# Patient Record
Sex: MALE | Race: WHITE | NOT HISPANIC OR LATINO | Employment: FULL TIME | URBAN - METROPOLITAN AREA
[De-identification: names, ages, dates, MRNs, and addresses within clinical notes are randomized per-mention and may not be internally consistent; named-entity substitution may affect disease eponyms.]

---

## 2017-06-14 PROBLEM — F41.8 MIXED ANXIETY DEPRESSIVE DISORDER: Status: ACTIVE | Noted: 2017-05-17

## 2019-06-06 ENCOUNTER — PATIENT OUTREACH (OUTPATIENT)
Dept: ADMINISTRATIVE | Facility: HOSPITAL | Age: 34
End: 2019-06-06

## 2024-01-27 ENCOUNTER — OUTSIDE PLACE OF SERVICE (OUTPATIENT)
Dept: ADMINISTRATIVE | Facility: OTHER | Age: 39
End: 2024-01-27
Payer: MEDICAID

## 2024-02-12 ENCOUNTER — TELEPHONE (OUTPATIENT)
Dept: NEUROSURGERY | Facility: CLINIC | Age: 39
End: 2024-02-12
Payer: MEDICAID

## 2024-02-12 DIAGNOSIS — Z98.1 S/P CERVICAL SPINAL FUSION: Primary | ICD-10-CM

## 2024-02-12 RX ORDER — TRAMADOL HYDROCHLORIDE 50 MG/1
50 TABLET ORAL EVERY 6 HOURS PRN
Qty: 28 TABLET | Refills: 3 | Status: SHIPPED | OUTPATIENT
Start: 2024-02-12 | End: 2024-02-12 | Stop reason: SDUPTHER

## 2024-02-12 NOTE — TELEPHONE ENCOUNTER
Returned call to pt, pt stated that he had a cervical fusion done with  on 1/29 and he will need his post-op appointments. I scheduled pt to see Negra on Wednesday 2/14 at 1 and xrays at 12. Pt si also requesting to know if  can call him in some pain medication. I stated to pt that I will send a message over to . Pt voiced understanding

## 2024-02-14 ENCOUNTER — HOSPITAL ENCOUNTER (OUTPATIENT)
Dept: RADIOLOGY | Facility: HOSPITAL | Age: 39
Discharge: HOME | End: 2024-02-14
Attending: PHYSICIAN ASSISTANT
Payer: MEDICAID

## 2024-02-14 ENCOUNTER — OFFICE VISIT (OUTPATIENT)
Dept: NEUROSURGERY | Facility: CLINIC | Age: 39
End: 2024-02-14
Payer: MEDICAID

## 2024-02-14 ENCOUNTER — TELEPHONE (OUTPATIENT)
Dept: NEUROSURGERY | Facility: CLINIC | Age: 39
End: 2024-02-14
Payer: MEDICAID

## 2024-02-14 ENCOUNTER — TELEPHONE (OUTPATIENT)
Dept: NEUROSURGERY | Facility: CLINIC | Age: 39
End: 2024-02-14

## 2024-02-14 VITALS
HEART RATE: 112 BPM | WEIGHT: 211 LBS | DIASTOLIC BLOOD PRESSURE: 87 MMHG | HEIGHT: 70 IN | TEMPERATURE: 99 F | SYSTOLIC BLOOD PRESSURE: 139 MMHG | BODY MASS INDEX: 30.21 KG/M2

## 2024-02-14 DIAGNOSIS — F17.200 SMOKING: ICD-10-CM

## 2024-02-14 DIAGNOSIS — Z98.1 S/P CERVICAL SPINAL FUSION: Primary | ICD-10-CM

## 2024-02-14 DIAGNOSIS — Z98.1 S/P CERVICAL SPINAL FUSION: ICD-10-CM

## 2024-02-14 DIAGNOSIS — S02.609D CLOSED FRACTURE OF JAW WITH ROUTINE HEALING, SUBSEQUENT ENCOUNTER: ICD-10-CM

## 2024-02-14 PROCEDURE — 72040 X-RAY EXAM NECK SPINE 2-3 VW: CPT | Mod: TC,FY

## 2024-02-14 PROCEDURE — 99999 PR PBB SHADOW E&M-EST. PATIENT-LVL V: CPT | Mod: PBBFAC,,, | Performed by: PHYSICIAN ASSISTANT

## 2024-02-14 PROCEDURE — 99024 POSTOP FOLLOW-UP VISIT: CPT | Mod: ,,, | Performed by: PHYSICIAN ASSISTANT

## 2024-02-14 PROCEDURE — 3075F SYST BP GE 130 - 139MM HG: CPT | Mod: CPTII,,, | Performed by: PHYSICIAN ASSISTANT

## 2024-02-14 PROCEDURE — 72040 X-RAY EXAM NECK SPINE 2-3 VW: CPT | Mod: 26,,, | Performed by: RADIOLOGY

## 2024-02-14 PROCEDURE — 1159F MED LIST DOCD IN RCRD: CPT | Mod: CPTII,,, | Performed by: PHYSICIAN ASSISTANT

## 2024-02-14 PROCEDURE — 1160F RVW MEDS BY RX/DR IN RCRD: CPT | Mod: CPTII,,, | Performed by: PHYSICIAN ASSISTANT

## 2024-02-14 PROCEDURE — 3079F DIAST BP 80-89 MM HG: CPT | Mod: CPTII,,, | Performed by: PHYSICIAN ASSISTANT

## 2024-02-14 PROCEDURE — 99215 OFFICE O/P EST HI 40 MIN: CPT | Mod: PBBFAC,25,PN | Performed by: PHYSICIAN ASSISTANT

## 2024-02-14 RX ORDER — TIZANIDINE 4 MG/1
4 TABLET ORAL EVERY 8 HOURS PRN
Qty: 40 TABLET | Refills: 0 | Status: SHIPPED | OUTPATIENT
Start: 2024-02-14

## 2024-02-14 RX ORDER — GABAPENTIN 300 MG/1
CAPSULE ORAL
Qty: 270 CAPSULE | Refills: 0 | Status: SHIPPED | OUTPATIENT
Start: 2024-02-14 | End: 2024-03-20 | Stop reason: SDUPTHER

## 2024-02-14 RX ORDER — TRAMADOL HYDROCHLORIDE 50 MG/1
50 TABLET ORAL EVERY 6 HOURS PRN
Qty: 28 TABLET | Refills: 3 | Status: SHIPPED | OUTPATIENT
Start: 2024-02-14 | End: 2024-02-14 | Stop reason: ALTCHOICE

## 2024-02-14 RX ORDER — OXYCODONE HYDROCHLORIDE 10 MG/1
10 TABLET ORAL EVERY 6 HOURS PRN
COMMUNITY
Start: 2024-01-29 | End: 2024-02-14 | Stop reason: ALTCHOICE

## 2024-02-14 RX ORDER — OXYCODONE HYDROCHLORIDE 10 MG/1
10 TABLET ORAL EVERY 6 HOURS PRN
Qty: 28 TABLET | Refills: 0 | Status: SHIPPED | OUTPATIENT
Start: 2024-02-14 | End: 2024-02-14 | Stop reason: SDUPTHER

## 2024-02-14 RX ORDER — IBUPROFEN 200 MG
1 TABLET ORAL DAILY
Qty: 30 PATCH | Refills: 1 | Status: SHIPPED | OUTPATIENT
Start: 2024-02-14

## 2024-02-14 NOTE — TELEPHONE ENCOUNTER
Dr. Valencia,    This patient is 2 weeks po ACDF from [facility].  He has left hand weakness, left wrist drop and can't move his fingers.    He is doing outpatient OT for his left hand.    Is there anything other tests or imaging we need to get in the next couple of months?    Thanks,  Negra Sahni, [facility], PA-C  Neurosurgery  [facility]  02/14/2024

## 2024-02-14 NOTE — PROGRESS NOTES
"Subjective:     Patient ID:  Kaleb Wolfe is a 39 y.o. male.    Erik    Chief Complaint: 2 week po fu    C5-6 C6-7 ACDF- [facility]    HPI    Kaleb Wolfe is a 39 y.o. male who presents for follow up.  Patient has neck pain not relieved with recent tramadol prescription. New right arm paresthesias to the hand that come and go.  Still with paresthesias in the left hand with radiation up the left arm to the upper arm.  Still with limited movement of the left wrist and fingers.  He has just started OT for his left hand.  Has been wearing the neck brace.  Smoking some.    Patient denies any recent accidents or trauma, no saddle anesthesias, and no bowel or bladder incontinence.      Review of Systems:  Constitution: Negative for chills, fever, night sweats and weight loss.   Musculoskeletal: Negative for falls.   Gastrointestinal: Negative for bowel incontinence, nausea and vomiting.   Genitourinary: Negative for bladder incontinence.   Neurological: Negative for disturbances in coordination and loss of balance.      Objective:      Vitals:    02/14/24 1321   BP: 139/87   Pulse: (!) 112   Temp: 98.5 °F (36.9 °C)   Weight: 95.7 kg (210 lb 15.7 oz)   Height: 5' 10" (1.778 m)   PainSc:   8   PainLoc: Neck         Physical Exam:      General:  Kaleb Wolfe is well-developed, well-nourished, appears stated age, in no acute distress, alert and oriented to person, place, and time.    Pulmonary/Chest:  Respiratory effort normal  Abdominal: Exhibits no distension  Psychiatric:  Normal mood and affect.  Behavior is normal.  Judgement and thought content normal      Musculoskeletal:    Cervical Spine Inspection:  surgical scars with no visible rashes.  Wound edges intact.  n.      Neurological:    Muscle strength against resistance:     Right Left   Deltoid  5 / 5 5 / 5   Biceps  5 / 5 5 / 5   Triceps 5 / 5 5 / 5   Wrist flexion  5 / 5 3 / 5   Wrist extension 5 / 5 0 / 5   Finger abduction 5 / 5 0 / 5   Thumb " opposition 5 / 5 0 / 5   Handgrip 5 / 5 0 / 5   Hip flexion  5 / 5 5 / 5   Hip extension 5 / 5 5 / 5   Hip abduction 5 / 5 5 / 5   Hip adduction 5/ 5 5 / 5   Knee extension  5 / 5 5 / 5   Knee flexion  5 / 5 5 / 5   Dorsiflexion  5 / 5 5 / 5   EHL  5 / 5 5 / 5   Plantar flexion  5 / 5 5 / 5   Inversion of the feet 5 / 5 5 / 5   Eversion of the feet 5 / 5 5 / 5       Reflexes:     Right Left   Triceps 2+ 2+   Biceps 2+ 2+   Brachioradialis 2+ 2+   Patellar 3+ 3+   Achilles 3+ 3+       Clonus:  Positive bilaterally  Gore: Negative bilaterally    On gross examination of the bilateral lower extremities, patient has full painfree ROM with no signs of clubbing, cyanosis, edema, and weakness.      XRAY Interpretation:     Cervical spine xrays were personally reviewed today.  No fractures. Hardware intact.      Assessment:          1. S/P cervical spinal fusion    2. Smoking    3. Closed fracture of jaw with routine healing, subsequent encounter            Plan:          Orders Placed This Encounter    X-Ray Cervical Spine AP And Lateral    X-Ray Cervical Spine AP And Lateral    X-Ray Facial Bones  3 Or More View    Ambulatory referral/consult to Smoking Cessation Program    gabapentin (NEURONTIN) 300 MG capsule    tiZANidine (ZANAFLEX) 4 MG tablet    oxyCODONE (ROXICODONE) 10 mg Tab immediate release tablet    nicotine (NICODERM CQ) 21 mg/24 hr       -Smoking cessation referral  -Nicotine patch  -Bone growth stimulator  -Avoid NSAIDs  -Oxycodone PRN.  DC tramadol  -Tizanidine spasms  -Start Gabapentin  -Xrays facial bones and fu with PCP for further recs  -FU in 4 weeks and at 3 months po with xrays for post op visits  -Continue to wear neck brace at all time except when showering and eating    Will review the above with Dr. Valencia    Follow-Up:  Follow up in about 4 weeks (around 3/13/2024). If there are any questions prior to this, the patient was instructed to contact the office.       Negra Sahni, [facility],  TL  Neurosurgery  [facility]  02/14/2024

## 2024-02-15 ENCOUNTER — TELEPHONE (OUTPATIENT)
Dept: NEUROSURGERY | Facility: CLINIC | Age: 39
End: 2024-02-15
Payer: MEDICAID

## 2024-02-15 ENCOUNTER — PATIENT MESSAGE (OUTPATIENT)
Dept: NEUROSURGERY | Facility: CLINIC | Age: 39
End: 2024-02-15
Payer: MEDICAID

## 2024-02-15 ENCOUNTER — HOSPITAL ENCOUNTER (OUTPATIENT)
Dept: RADIOLOGY | Facility: HOSPITAL | Age: 39
Discharge: HOME | End: 2024-02-15
Attending: PHYSICIAN ASSISTANT
Payer: MEDICAID

## 2024-02-15 DIAGNOSIS — S02.609D CLOSED FRACTURE OF JAW WITH ROUTINE HEALING, SUBSEQUENT ENCOUNTER: ICD-10-CM

## 2024-02-15 DIAGNOSIS — M54.12 CERVICAL RADICULOPATHY AT C8: Primary | ICD-10-CM

## 2024-02-15 PROCEDURE — 70150 X-RAY EXAM OF FACIAL BONES: CPT | Mod: 26,,, | Performed by: RADIOLOGY

## 2024-02-15 PROCEDURE — 70150 X-RAY EXAM OF FACIAL BONES: CPT | Mod: TC,PO

## 2024-02-15 RX ORDER — OXYCODONE HYDROCHLORIDE 10 MG/1
10 TABLET ORAL EVERY 6 HOURS PRN
Qty: 28 TABLET | Refills: 0 | Status: SHIPPED | OUTPATIENT
Start: 2024-02-15 | End: 2024-02-20 | Stop reason: SDUPTHER

## 2024-02-15 NOTE — TELEPHONE ENCOUNTER
----- Message from Patricia Cordero MA sent at 2/14/2024  3:42 PM CST -----  Good Afternoon Mrs. Omer,     [facility] he was wondering if medication can be sent this pharmacy instead :  [id number] - [place], [place] - [address]  AT [facility] [address] [facility] [address]   ----- Message -----  From: Gorge Garcia  Sent: 2/14/2024   3:40 PM CST  To: Bora LOZANO Staff    Type:  Pt Calling to Clarify an RX    Name of Caller:pt  Pharmacy Name:[facility] #[id number] - [place], [place] - [address] AT [facility] [address] [place]   Phone: [phone]  Fax: [phone]  Prescription Name:oxyCODONE (ROXICODONE) 10 mg Tab immediate release tablet  What do they need to clarify[facility]   Best Call Back Number:[phone]  Additional Information: please send to pharmacy listed above

## 2024-02-15 NOTE — TELEPHONE ENCOUNTER
Oxycodone sent to [facility] in [facility]    Negra Sahni, MARGUERITE, PA-C  Neurosurgery  [facility]  02/15/2024

## 2024-02-16 ENCOUNTER — TELEPHONE (OUTPATIENT)
Dept: NEUROSURGERY | Facility: CLINIC | Age: 39
End: 2024-02-16
Payer: MEDICAID

## 2024-02-16 NOTE — TELEPHONE ENCOUNTER
Spoke with patient in regards to his medication and miscommunication on supply quantity. Pt also states he has a orbital fx.    AS

## 2024-02-16 NOTE — TELEPHONE ENCOUNTER
Please clarify.  I didn't order PT.  He should only get PT/OT for his left hand and arm.    Thanks,  Negra Sahni, [facility], PA-C  Neurosurgery  [facility]  02/16/2024

## 2024-02-16 NOTE — TELEPHONE ENCOUNTER
----- Message from Patricia Cordero MA sent at 2/16/2024 10:27 AM CST -----  Regarding: FW: Call back  Contact: [phone]  Anneliese Mrs. Omer,     Pt is requesting PT orders to be sent to [facility] but I do not see any PT orders in his active requests or referrals. Please Advise.     Thanks,   Jay Jay   ----- Message -----  From: Emilie Vegas  Sent: 2/16/2024  10:08 AM CST  To: Bora LOZANO Staff  Subject: Call back                                        Who Called: PT     Patient is calling to get orders for physical therapy to be sent to [facility] there number is [phone] fax [phone]. Please advice

## 2024-02-19 ENCOUNTER — PATIENT MESSAGE (OUTPATIENT)
Dept: NEUROSURGERY | Facility: CLINIC | Age: 39
End: 2024-02-19
Payer: MEDICAID

## 2024-02-20 ENCOUNTER — TELEPHONE (OUTPATIENT)
Dept: ORTHOPEDICS | Facility: CLINIC | Age: 39
End: 2024-02-20
Payer: MEDICAID

## 2024-02-20 RX ORDER — OXYCODONE HYDROCHLORIDE 10 MG/1
10 TABLET ORAL EVERY 6 HOURS PRN
Qty: 28 TABLET | Refills: 0 | Status: SHIPPED | OUTPATIENT
Start: 2024-02-22 | End: 2024-02-26 | Stop reason: SDUPTHER

## 2024-02-20 NOTE — TELEPHONE ENCOUNTER
Pt wants phone call to review Xrays and talk about a potential physician for his face fractures.     AS

## 2024-02-20 NOTE — TELEPHONE ENCOUNTER
Dr. Valencia said that     Dr Rossi ENT at [facility] who should follow-up on this patient.    Ghulam Sullivan MD Ophthalmology Consulting Physician Ophthalmology at [facility] also.    These MDs saw him at his last hospital admission. Please contact [facility] outpatient clinics to try to get him setup.    Thanks,  Negra Sahni, [facility], PA-C  Neurosurgery  [facility]  02/20/2024

## 2024-02-20 NOTE — TELEPHONE ENCOUNTER
AMOS for patient reiterating I made a mistake on whether he needed 30 day or 7 day supply. Explained he needs to go to Dr. Valencia for his next refill request

## 2024-02-21 ENCOUNTER — HOSPITAL ENCOUNTER (OUTPATIENT)
Dept: RADIOLOGY | Facility: HOSPITAL | Age: 39
Discharge: HOME | End: 2024-02-21
Attending: NEUROLOGICAL SURGERY
Payer: MEDICAID

## 2024-02-21 DIAGNOSIS — Z98.1 S/P CERVICAL SPINAL FUSION: ICD-10-CM

## 2024-02-21 PROCEDURE — 72040 X-RAY EXAM NECK SPINE 2-3 VW: CPT | Mod: TC,PO

## 2024-02-21 PROCEDURE — 72040 X-RAY EXAM NECK SPINE 2-3 VW: CPT | Mod: 26,,, | Performed by: RADIOLOGY

## 2024-02-22 ENCOUNTER — PATIENT MESSAGE (OUTPATIENT)
Dept: NEUROSURGERY | Facility: CLINIC | Age: 39
End: 2024-02-22
Payer: MEDICAID

## 2024-02-26 ENCOUNTER — PATIENT MESSAGE (OUTPATIENT)
Dept: NEUROSURGERY | Facility: CLINIC | Age: 39
End: 2024-02-26
Payer: MEDICAID

## 2024-02-26 NOTE — TELEPHONE ENCOUNTER
Pt is requesting a refill request from Mrs. Negra DOTY and after this refill will switch to PCP or Dr. Valencia

## 2024-02-27 ENCOUNTER — PATIENT MESSAGE (OUTPATIENT)
Dept: NEUROSURGERY | Facility: CLINIC | Age: 39
End: 2024-02-27
Payer: MEDICAID

## 2024-02-27 RX ORDER — OXYCODONE HYDROCHLORIDE 10 MG/1
10 TABLET ORAL EVERY 6 HOURS PRN
Qty: 28 TABLET | Refills: 0 | Status: SHIPPED | OUTPATIENT
Start: 2024-02-29 | End: 2024-03-20 | Stop reason: SDUPTHER

## 2024-02-29 ENCOUNTER — TELEPHONE (OUTPATIENT)
Dept: NEUROSURGERY | Facility: CLINIC | Age: 39
End: 2024-02-29
Payer: MEDICAID

## 2024-02-29 ENCOUNTER — PATIENT MESSAGE (OUTPATIENT)
Dept: NEUROSURGERY | Facility: CLINIC | Age: 39
End: 2024-02-29
Payer: MEDICAID

## 2024-02-29 DIAGNOSIS — Z98.1 S/P CERVICAL SPINAL FUSION: Primary | ICD-10-CM

## 2024-03-13 ENCOUNTER — TELEPHONE (OUTPATIENT)
Dept: NEUROSURGERY | Facility: CLINIC | Age: 39
End: 2024-03-13
Payer: MEDICAID

## 2024-03-19 ENCOUNTER — TELEPHONE (OUTPATIENT)
Dept: NEUROSURGERY | Facility: CLINIC | Age: 39
End: 2024-03-19
Payer: MEDICAID

## 2024-03-19 NOTE — TELEPHONE ENCOUNTER
He has no transportation so we rescheduled his Xrays closer to his home and made the appointment virtual. Pt has had to cancel multiple times and wants a virtual appt at least.    AS

## 2024-03-20 ENCOUNTER — TELEPHONE (OUTPATIENT)
Dept: NEUROLOGY | Facility: CLINIC | Age: 39
End: 2024-03-20
Payer: MEDICAID

## 2024-03-20 ENCOUNTER — OFFICE VISIT (OUTPATIENT)
Dept: NEUROSURGERY | Facility: CLINIC | Age: 39
End: 2024-03-20
Payer: MEDICAID

## 2024-03-20 ENCOUNTER — TELEPHONE (OUTPATIENT)
Dept: NEUROSURGERY | Facility: CLINIC | Age: 39
End: 2024-03-20
Payer: MEDICAID

## 2024-03-20 ENCOUNTER — HOSPITAL ENCOUNTER (OUTPATIENT)
Dept: RADIOLOGY | Facility: HOSPITAL | Age: 39
Discharge: HOME | End: 2024-03-20
Attending: PHYSICIAN ASSISTANT
Payer: MEDICAID

## 2024-03-20 DIAGNOSIS — M54.2 NECK PAIN: ICD-10-CM

## 2024-03-20 DIAGNOSIS — Z98.1 S/P CERVICAL SPINAL FUSION: ICD-10-CM

## 2024-03-20 DIAGNOSIS — Z98.1 S/P CERVICAL SPINAL FUSION: Primary | ICD-10-CM

## 2024-03-20 DIAGNOSIS — R29.898 LEFT HAND WEAKNESS: ICD-10-CM

## 2024-03-20 PROCEDURE — 99024 POSTOP FOLLOW-UP VISIT: CPT | Mod: 95,,, | Performed by: PHYSICIAN ASSISTANT

## 2024-03-20 PROCEDURE — 72040 X-RAY EXAM NECK SPINE 2-3 VW: CPT | Mod: TC,PO

## 2024-03-20 PROCEDURE — 72040 X-RAY EXAM NECK SPINE 2-3 VW: CPT | Mod: 26,,, | Performed by: RADIOLOGY

## 2024-03-20 PROCEDURE — 1159F MED LIST DOCD IN RCRD: CPT | Mod: CPTII,95,, | Performed by: PHYSICIAN ASSISTANT

## 2024-03-20 PROCEDURE — 1160F RVW MEDS BY RX/DR IN RCRD: CPT | Mod: CPTII,95,, | Performed by: PHYSICIAN ASSISTANT

## 2024-03-20 RX ORDER — OXYCODONE HYDROCHLORIDE 10 MG/1
10 TABLET ORAL EVERY 6 HOURS PRN
Qty: 28 TABLET | Refills: 0 | Status: SHIPPED | OUTPATIENT
Start: 2024-03-20 | End: 2024-04-24 | Stop reason: SDUPTHER

## 2024-03-20 RX ORDER — GABAPENTIN 300 MG/1
300 CAPSULE ORAL 3 TIMES DAILY
Qty: 90 CAPSULE | Refills: 2 | Status: SHIPPED | OUTPATIENT
Start: 2024-03-20

## 2024-03-20 RX ORDER — CELECOXIB 200 MG/1
200 CAPSULE ORAL 2 TIMES DAILY PRN
Qty: 60 CAPSULE | Refills: 2 | Status: SHIPPED | OUTPATIENT
Start: 2024-03-20

## 2024-03-20 NOTE — TELEPHONE ENCOUNTER
Please move his EMG to an earlier date.    Negra Sahni, [facility], PA-C  Neurosurgery  [facility]  03/20/2024

## 2024-03-22 ENCOUNTER — TELEPHONE (OUTPATIENT)
Dept: NEUROSURGERY | Facility: CLINIC | Age: 39
End: 2024-03-22
Payer: MEDICAID

## 2024-03-22 NOTE — TELEPHONE ENCOUNTER
Returned call to [facility] physical therapy spoke w/ Jaimie, I stated to Jaimie that Negra wanted him to do PT/OT for arms and hands. I also stated I will follow up with Negra for clarification. Jaimie voiced understanding

## 2024-03-25 ENCOUNTER — TELEPHONE (OUTPATIENT)
Dept: NEUROSURGERY | Facility: CLINIC | Age: 39
End: 2024-03-25
Payer: MEDICAID

## 2024-03-25 DIAGNOSIS — R29.898 LEFT HAND WEAKNESS: ICD-10-CM

## 2024-03-25 DIAGNOSIS — Z98.1 S/P CERVICAL SPINAL FUSION: Primary | ICD-10-CM

## 2024-03-25 NOTE — TELEPHONE ENCOUNTER
----- Message from Roderick Hayden MA sent at 3/22/2024  1:39 PM CDT -----  Did you want the pt to do internal PT/OT for his arms and hands. Please advise    MT   ----- Message -----  From: Talya Villar  Sent: 3/22/2024   1:29 PM CDT  To: Bora LOZANO Staff    Type:  Referral for Physical Therapy     Who Called: [facility]   Would the patient rather a call back or a response via [facility]? Call back   Best Call Back Number: [phone]  Additional Information: Please be advised, caller if physical therapy referral can be sent over to [facility] since pt moved to [place] and have it faxed to [phone]

## 2024-03-25 NOTE — TELEPHONE ENCOUNTER
External PT ordered for sp neck fusion and OT ordered for left hand weakness.    Please print out and fax.    Let patient know    MARGUERITE Espinoza, PA-C  Neurosurgery  [facility]  03/25/2024

## 2024-04-22 ENCOUNTER — TELEPHONE (OUTPATIENT)
Dept: NEUROSURGERY | Facility: CLINIC | Age: 39
End: 2024-04-22
Payer: MEDICAID

## 2024-04-22 ENCOUNTER — PATIENT MESSAGE (OUTPATIENT)
Dept: NEUROSURGERY | Facility: CLINIC | Age: 39
End: 2024-04-22
Payer: MEDICAID

## 2024-04-22 NOTE — TELEPHONE ENCOUNTER
Returned pt's call, pt is requesting to know if I know what he was talking about with his hand. I stated to pt that I sent a message over to Negra and she will discuss it with him. Pt voiced understanding

## 2024-04-23 ENCOUNTER — TELEPHONE (OUTPATIENT)
Dept: NEUROSURGERY | Facility: CLINIC | Age: 39
End: 2024-04-23
Payer: MEDICAID

## 2024-04-23 NOTE — TELEPHONE ENCOUNTER
Dr. Valencia,    He is getting an EMG on April 24th.  He has been working with PTOT.    Do you recommend a splint for his wrist?    Negra Sahni, [facility], PA-C  Neurosurgery  [facility]  04/23/2024

## 2024-04-23 NOTE — TELEPHONE ENCOUNTER
Returned pt's call, pt is requesting to know if we heard anything from Negra about the splint. I stated to pt that I have not heard anything else. Pt voiced understanding

## 2024-04-24 ENCOUNTER — PROCEDURE VISIT (OUTPATIENT)
Dept: NEUROLOGY | Facility: CLINIC | Age: 39
End: 2024-04-24
Payer: MEDICAID

## 2024-04-24 DIAGNOSIS — G56.32 RADIAL NERVE PALSY, LEFT: Primary | ICD-10-CM

## 2024-04-24 DIAGNOSIS — G56.32 LEFT RADIAL NERVE PALSY: ICD-10-CM

## 2024-04-24 PROCEDURE — 95886 MUSC TEST DONE W/N TEST COMP: CPT | Mod: 26,S$PBB,, | Performed by: PSYCHIATRY & NEUROLOGY

## 2024-04-24 PROCEDURE — 99203 OFFICE O/P NEW LOW 30 MIN: CPT | Mod: S$PBB,,, | Performed by: PSYCHIATRY & NEUROLOGY

## 2024-04-24 PROCEDURE — 95913 NRV CNDJ TEST 13/> STUDIES: CPT | Mod: 26,S$PBB,, | Performed by: PSYCHIATRY & NEUROLOGY

## 2024-04-24 PROCEDURE — 95913 NRV CNDJ TEST 13/> STUDIES: CPT | Mod: PBBFAC,PN | Performed by: PSYCHIATRY & NEUROLOGY

## 2024-04-24 PROCEDURE — 95886 MUSC TEST DONE W/N TEST COMP: CPT | Mod: PBBFAC,PN | Performed by: PSYCHIATRY & NEUROLOGY

## 2024-04-24 RX ORDER — OXYCODONE HYDROCHLORIDE 10 MG/1
10 TABLET ORAL EVERY 6 HOURS PRN
Qty: 28 TABLET | Refills: 0 | Status: SHIPPED | OUTPATIENT
Start: 2024-04-24 | End: 2024-05-08 | Stop reason: SDUPTHER
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
## 2024-04-26 ENCOUNTER — TELEPHONE (OUTPATIENT)
Dept: NEUROSURGERY | Facility: CLINIC | Age: 39
End: 2024-04-26
Payer: MEDICAID

## 2024-04-26 ENCOUNTER — PATIENT MESSAGE (OUTPATIENT)
Dept: NEUROSURGERY | Facility: CLINIC | Age: 39
End: 2024-04-26
Payer: MEDICAID

## 2024-04-26 NOTE — TELEPHONE ENCOUNTER
"Informed pt, per      "referral with neurosurgeon Dr Jovan Bassett for his left radial nerve palsy. Please let the patient know that he needs to follow-up with Dr Bassett for this problem. Dr Bassett treat peripheral nerve issues and may be able to offer him further treatments. I will keep following him for his cervical fusion."      Pt voiced understanding   "

## 2024-04-26 NOTE — TELEPHONE ENCOUNTER
Roderick does the occupational therapist need an order for the splint?  If so we would need the name of the splint.    Thanks,  Negra Sahni, [facility], PA-C  Neurosurgery  [facility]  04/26/2024

## 2024-04-29 ENCOUNTER — TELEPHONE (OUTPATIENT)
Dept: NEUROSURGERY | Facility: CLINIC | Age: 39
End: 2024-04-29
Payer: MEDICAID

## 2024-04-29 NOTE — TELEPHONE ENCOUNTER
Returned pt's call, informed pt that I spoke with his physical therapist they gave me all the information I needed. I also stated that I will send it over to Negra so she can get it ordered. Pt voiced understanding

## 2024-04-30 ENCOUNTER — TELEPHONE (OUTPATIENT)
Dept: NEUROSURGERY | Facility: CLINIC | Age: 39
End: 2024-04-30
Payer: MEDICAID

## 2024-04-30 NOTE — TELEPHONE ENCOUNTER
Hand splint RX done.    Please fax.    Negra Sahni [facility], PAClintC  Neurosurgery  [facility]  04/30/2024

## 2024-04-30 NOTE — TELEPHONE ENCOUNTER
----- Message from Roderick Hayden MA sent at 4/29/2024 11:06 AM CDT -----  Regarding: FW: update  Contact: @ [phone]  Spoke with physical therapist, the splint is called dynamic hand splint  ----- Message -----  From: Wendy Vasquez  Sent: 4/29/2024  10:33 AM CDT  To: Bora LOZANO Staff  Subject: update                                           Pt called in regards to getting a update on his splint that he is requesting .....Please call and adv @ [phone]

## 2024-05-01 ENCOUNTER — OFFICE VISIT (OUTPATIENT)
Dept: NEUROSURGERY | Facility: CLINIC | Age: 39
End: 2024-05-01
Payer: MEDICAID

## 2024-05-01 ENCOUNTER — HOSPITAL ENCOUNTER (OUTPATIENT)
Dept: RADIOLOGY | Facility: HOSPITAL | Age: 39
Discharge: HOME | End: 2024-05-01
Attending: PHYSICIAN ASSISTANT
Payer: MEDICAID

## 2024-05-01 ENCOUNTER — TELEPHONE (OUTPATIENT)
Dept: NEUROSURGERY | Facility: CLINIC | Age: 39
End: 2024-05-01
Payer: MEDICAID

## 2024-05-01 VITALS
SYSTOLIC BLOOD PRESSURE: 125 MMHG | HEIGHT: 70 IN | DIASTOLIC BLOOD PRESSURE: 79 MMHG | WEIGHT: 211 LBS | BODY MASS INDEX: 30.21 KG/M2 | HEART RATE: 96 BPM

## 2024-05-01 DIAGNOSIS — S44.22XD INJURY OF RADIAL NERVE AT LEFT UPPER ARM LEVEL, SUBSEQUENT ENCOUNTER: ICD-10-CM

## 2024-05-01 DIAGNOSIS — Z98.1 S/P CERVICAL SPINAL FUSION: ICD-10-CM

## 2024-05-01 DIAGNOSIS — Z98.1 STATUS POST CERVICAL SPINAL FUSION: Primary | ICD-10-CM

## 2024-05-01 DIAGNOSIS — Z98.1 S/P CERVICAL SPINAL FUSION: Primary | ICD-10-CM

## 2024-05-01 PROCEDURE — 3078F DIAST BP <80 MM HG: CPT | Mod: CPTII,,, | Performed by: NEUROLOGICAL SURGERY

## 2024-05-01 PROCEDURE — 72040 X-RAY EXAM NECK SPINE 2-3 VW: CPT | Mod: 26,,, | Performed by: RADIOLOGY

## 2024-05-01 PROCEDURE — 3008F BODY MASS INDEX DOCD: CPT | Mod: CPTII,,, | Performed by: NEUROLOGICAL SURGERY

## 2024-05-01 PROCEDURE — 99213 OFFICE O/P EST LOW 20 MIN: CPT | Mod: PBBFAC,25,PN | Performed by: NEUROLOGICAL SURGERY

## 2024-05-01 PROCEDURE — 99999 PR PBB SHADOW E&M-EST. PATIENT-LVL III: CPT | Mod: PBBFAC,,, | Performed by: NEUROLOGICAL SURGERY

## 2024-05-01 PROCEDURE — 99213 OFFICE O/P EST LOW 20 MIN: CPT | Mod: S$PBB,,, | Performed by: NEUROLOGICAL SURGERY

## 2024-05-01 PROCEDURE — 1159F MED LIST DOCD IN RCRD: CPT | Mod: CPTII,,, | Performed by: NEUROLOGICAL SURGERY

## 2024-05-01 PROCEDURE — 3074F SYST BP LT 130 MM HG: CPT | Mod: CPTII,,, | Performed by: NEUROLOGICAL SURGERY

## 2024-05-01 PROCEDURE — 72040 X-RAY EXAM NECK SPINE 2-3 VW: CPT | Mod: TC,FY
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
## 2024-05-03 ENCOUNTER — TELEPHONE (OUTPATIENT)
Dept: NEUROSURGERY | Facility: CLINIC | Age: 39
End: 2024-05-03
Payer: MEDICAID

## 2024-05-03 NOTE — TELEPHONE ENCOUNTER
Returned pt's call, pt is requesting to know what is going on with his dynamic splint for his hand. I stated to pt that I faxed over the order to the place of his recommendation on Wednesday. Pt stated that he will give them a call and voiced understanding

## 2024-05-05 ENCOUNTER — PATIENT MESSAGE (OUTPATIENT)
Dept: NEUROSURGERY | Facility: CLINIC | Age: 39
End: 2024-05-05
Payer: MEDICAID

## 2024-05-06 ENCOUNTER — TELEPHONE (OUTPATIENT)
Dept: NEUROSURGERY | Facility: CLINIC | Age: 39
End: 2024-05-06
Payer: MEDICAID

## 2024-05-06 NOTE — TELEPHONE ENCOUNTER
Returned pt's call, pt stated that he would like to me to fax his hand splint order to [facility]. Pt provided me with the fax number. I stated to pt that I will fax it over and pt voiced understanding

## 2024-05-09 ENCOUNTER — TELEPHONE (OUTPATIENT)
Dept: NEUROLOGY | Facility: CLINIC | Age: 39
End: 2024-05-09
Payer: MEDICAID

## 2024-05-09 RX ORDER — OXYCODONE HYDROCHLORIDE 10 MG/1
10 TABLET ORAL EVERY 8 HOURS PRN
Qty: 21 TABLET | Refills: 0 | Status: SHIPPED | OUTPATIENT
Start: 2024-05-09

## 2024-05-09 NOTE — TELEPHONE ENCOUNTER
Called and spoke to patient about we received all his messages about his prescription wanting a refill. I told him as soon as the doctor or PA sends a refill in we will let him know. Pt voiced understanding.

## 2024-05-09 NOTE — TELEPHONE ENCOUNTER
----- Message from Marquise Bee sent at 5/9/2024 11:10 AM CDT -----  Type:  RX Refill Request    Who Called: pt  Refill or New Rx:refill  RX Name and Strength:oxyCODONE (ROXICODONE) 10 mg Tab immediate release tablet  Preferred Pharmacy with phone number:[id number] - [facility] [address]  Local or Mail Order:local   Ordering Provider:wilber   Would the patient rather a call back or a response via [facility]? Call   Best Call Back Number:[phone]  Additional Information: pt stated he called yesterday regarding this

## 2024-05-09 NOTE — TELEPHONE ENCOUNTER
----- Message from Jaimie Brice MA sent at 5/9/2024 10:29 AM CDT -----  Regarding: FW: medication  Contact: [phone]  Spoke to patient and his neck is hurting a lot and wants a refill of oxyCODONE (ROXICODONE) 10 mg Tab immediate release tablet  ----- Message -----  From: Deonna Ellington  Sent: 5/9/2024   9:56 AM CDT  To: [facility] Staff  Subject: medication                                       Pt calling in regarding medication oxyCODONE (ROXICODONE) 10 mg Tab immediate release tablet please call to discuss Further

## 2024-05-16 NOTE — TELEPHONE ENCOUNTER
Called pt and let him know their isnt an earlier date to for the EMG to be scheduled he understood   AS   Attending Attestation (For Attendings USE Only)...

## 2024-05-24 ENCOUNTER — TELEPHONE (OUTPATIENT)
Dept: NEUROSURGERY | Facility: CLINIC | Age: 39
End: 2024-05-24
Payer: MEDICAID

## 2024-05-24 DIAGNOSIS — Z98.1 S/P CERVICAL SPINAL FUSION: Primary | ICD-10-CM

## 2024-05-24 NOTE — TELEPHONE ENCOUNTER
Please schedule cspine xrays.    MARGUERITE Espinoza, PA-C  Neurosurgery  [facility]  05/24/2024
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
## 2024-05-27 ENCOUNTER — HOSPITAL ENCOUNTER (OUTPATIENT)
Dept: RADIOLOGY | Facility: HOSPITAL | Age: 39
Discharge: HOME | End: 2024-05-27
Attending: PHYSICIAN ASSISTANT
Payer: MEDICAID

## 2024-05-27 DIAGNOSIS — Z98.1 S/P CERVICAL SPINAL FUSION: ICD-10-CM

## 2024-05-27 PROCEDURE — 72040 X-RAY EXAM NECK SPINE 2-3 VW: CPT | Mod: 26,,, | Performed by: RADIOLOGY

## 2024-05-27 PROCEDURE — 72040 X-RAY EXAM NECK SPINE 2-3 VW: CPT | Mod: TC,PO

## 2024-06-21 ENCOUNTER — TELEPHONE (OUTPATIENT)
Dept: NEUROSURGERY | Facility: CLINIC | Age: 39
End: 2024-06-21
Payer: MEDICAID

## 2024-06-21 RX ORDER — OXYCODONE HYDROCHLORIDE 10 MG/1
10 TABLET ORAL EVERY 8 HOURS PRN
Qty: 21 TABLET | Refills: 0 | Status: SHIPPED | OUTPATIENT
Start: 2024-06-21

## 2024-06-23 RX ORDER — OXYCODONE HYDROCHLORIDE 10 MG/1
10 TABLET ORAL EVERY 8 HOURS PRN
Qty: 21 TABLET | Refills: 0 | OUTPATIENT
Start: 2024-06-23

## 2024-10-12 ENCOUNTER — PATIENT MESSAGE (OUTPATIENT)
Dept: NEUROSURGERY | Facility: CLINIC | Age: 39
End: 2024-10-12
Payer: MEDICAID

## 2025-01-07 DIAGNOSIS — G89.29 OTHER CHRONIC PAIN: Primary | ICD-10-CM

## 2025-01-08 RX ORDER — TRAMADOL HYDROCHLORIDE 50 MG/1
50 TABLET ORAL EVERY 8 HOURS PRN
Qty: 45 TABLET | Refills: 2 | Status: SHIPPED | OUTPATIENT
Start: 2025-01-08

## 2025-06-12 ENCOUNTER — HOSPITAL ENCOUNTER (EMERGENCY)
Facility: OTHER | Age: 40
Discharge: HOME | End: 2025-06-12
Attending: EMERGENCY MEDICINE
Payer: MEDICAID

## 2025-06-12 VITALS
TEMPERATURE: 98 F | SYSTOLIC BLOOD PRESSURE: 132 MMHG | DIASTOLIC BLOOD PRESSURE: 84 MMHG | WEIGHT: 200 LBS | BODY MASS INDEX: 28.63 KG/M2 | HEIGHT: 70 IN | OXYGEN SATURATION: 99 % | RESPIRATION RATE: 16 BRPM | HEART RATE: 67 BPM

## 2025-06-12 DIAGNOSIS — S39.012A LUMBAR STRAIN, INITIAL ENCOUNTER: Primary | ICD-10-CM

## 2025-06-12 DIAGNOSIS — M54.50 ACUTE LEFT-SIDED LOW BACK PAIN WITHOUT SCIATICA: ICD-10-CM

## 2025-06-12 PROCEDURE — 63600175 PHARM REV CODE 636 W HCPCS: Mod: JZ,TB

## 2025-06-12 PROCEDURE — 99284 EMERGENCY DEPT VISIT MOD MDM: CPT | Mod: 25

## 2025-06-12 PROCEDURE — 25000003 PHARM REV CODE 250

## 2025-06-12 PROCEDURE — 96372 THER/PROPH/DIAG INJ SC/IM: CPT

## 2025-06-12 RX ORDER — METHOCARBAMOL 500 MG/1
1000 TABLET, FILM COATED ORAL 3 TIMES DAILY PRN
Qty: 15 TABLET | Refills: 0 | Status: SHIPPED | OUTPATIENT
Start: 2025-06-12 | End: 2025-06-17

## 2025-06-12 RX ORDER — LIDOCAINE 50 MG/G
2 PATCH TOPICAL DAILY
Qty: 10 PATCH | Refills: 0 | Status: SHIPPED | OUTPATIENT
Start: 2025-06-12 | End: 2025-06-17

## 2025-06-12 RX ORDER — IBUPROFEN 800 MG/1
800 TABLET, FILM COATED ORAL EVERY 6 HOURS PRN
Qty: 20 TABLET | Refills: 0 | Status: SHIPPED | OUTPATIENT
Start: 2025-06-12 | End: 2025-06-17

## 2025-06-12 RX ORDER — LIDOCAINE 50 MG/G
2 PATCH TOPICAL
Status: DISCONTINUED | OUTPATIENT
Start: 2025-06-12 | End: 2025-06-12 | Stop reason: HOSPADM

## 2025-06-12 RX ORDER — KETOROLAC TROMETHAMINE 30 MG/ML
30 INJECTION, SOLUTION INTRAMUSCULAR; INTRAVENOUS
Status: COMPLETED | OUTPATIENT
Start: 2025-06-12 | End: 2025-06-12

## 2025-06-12 RX ORDER — METHOCARBAMOL 500 MG/1
500 TABLET, FILM COATED ORAL
Status: COMPLETED | OUTPATIENT
Start: 2025-06-12 | End: 2025-06-12

## 2025-06-12 RX ADMIN — LIDOCAINE 2 PATCH: 50 PATCH CUTANEOUS at 01:06

## 2025-06-12 RX ADMIN — METHOCARBAMOL 500 MG: 500 TABLET ORAL at 01:06

## 2025-06-12 RX ADMIN — KETOROLAC TROMETHAMINE 30 MG: 30 INJECTION, SOLUTION INTRAMUSCULAR at 01:06

## 2025-06-12 NOTE — DISCHARGE INSTRUCTIONS
Take prescribed medications as needed for your pain.  Avoid any heavy lifting for the next week.  You can follow-up with physical therapy.  Referral has been provided.

## 2025-06-12 NOTE — ED PROVIDER NOTES
Encounter Date: 6/12/2025    SCRIBE #1 NOTE: I, Natalydi Morse, am scribing for, and in the presence of,  Erik Aguirre PA-C.       History     Chief Complaint   Patient presents with    Back Pain     EMS activated for sudden onset of back pain after picking up mop bucket prior to arrival at [facility]     39 y/o healthy male presenting via [facility] for lower back pain s/p picking up a bucket of water around 10am this morning. States that he felt a pop in his lower back. No history of back injury or surgery. He does have history of cervical spinal fusion a couple of years ago. He is unable to completely stand straight up due to the pain. Pt denies any numbness or tingling.  He denies urinary retention, bladder or bowel incontinence, or perianal numbness.  He hasn't taken any medications for his pain. Pt currently resides at [facility] for alcohol and opioid abuse.       The history is provided by the patient. No  was used.     Review of patient's allergies indicates:  No Known Allergies  History reviewed. No pertinent past medical history.  Past Surgical History:   Procedure Laterality Date    TONSILLECTOMY       No family history on file.  Social History[1]  Review of Systems   Constitutional:  Negative for chills and fever.   HENT:  Negative for congestion, rhinorrhea and sore throat.    Respiratory:  Negative for cough and shortness of breath.    Cardiovascular:  Negative for chest pain.   Gastrointestinal:  Negative for abdominal pain, diarrhea, nausea and vomiting.   Genitourinary:  Negative for dysuria, frequency and urgency.   Musculoskeletal:  Positive for back pain.   Skin:  Negative for rash.   Neurological:  Negative for dizziness and headaches.   Psychiatric/Behavioral:  Negative for confusion.        Physical Exam     Initial Vitals   BP Pulse Resp Temp SpO2   06/12/25 1215 06/12/25 1215 06/12/25 1322 06/12/25 1215 06/12/25 1215   121/79 75 16 97.6 °F (36.4 °C) (!) 94 %      MAP       --                 Physical Exam    Nursing note and vitals reviewed.  Constitutional: He appears well-developed and well-nourished. No distress.   HENT:   Head: Normocephalic and atraumatic.   Eyes: Conjunctivae and EOM are normal. Pupils are equal, round, and reactive to light.   Neck: Neck supple.   Normal range of motion.  Cardiovascular:  Normal rate, regular rhythm, normal heart sounds and intact distal pulses.           Pulmonary/Chest: Breath sounds normal. No respiratory distress. He has no wheezes. He has no rhonchi. He has no rales.   Abdominal: Abdomen is soft. He exhibits no distension. There is no abdominal tenderness.   Musculoskeletal:         General: No edema. Normal range of motion.      Cervical back: Normal range of motion and neck supple.      Comments: Left paraspinal lumbar TTP. No midline tenderness. No crepitus, step-off, or deformities. No overlying skin changes. Able to range BLE w/o difficulty.     Neurological: He is alert and oriented to person, place, and time. He has normal strength. No cranial nerve deficit.   Skin: Skin is warm and dry.   Psychiatric: He has a normal mood and affect. His behavior is normal. Judgment and thought content normal.         ED Course   Procedures  Labs Reviewed - No data to display       Imaging Results              X-Ray Lumbar Spine Ap And Lateral (Final result)  Result time 06/12/25 13:15:14      Final result by Wilda Wright MD (06/12/25 13:15:14)                   Impression:      No acute osseous abnormality seen.      Electronically signed by: Wilda Wright  Date:    06/12/2025  Time:    13:15               Narrative:    EXAMINATION:  XR LUMBAR SPINE AP AND LATERAL    CLINICAL HISTORY:  low back pain;    TECHNIQUE:  AP, lateral and spot images were performed of the lumbar spine.    COMPARISON:  None    FINDINGS:  Five lumbar vertebral bodies.  Vertebral body heights are maintained.    Disc spaces are well maintained.    AP alignment appears  "anatomic.                                       Medications   LIDOcaine 5 % patch 2 patch (2 patches Transdermal Patch Applied 6/12/25 1310)   ketorolac injection 30 mg (30 mg Intramuscular Given 6/12/25 1315)   methocarbamoL tablet 500 mg (500 mg Oral Given 6/12/25 1313)     Medical Decision Making  Amount and/or Complexity of Data Reviewed  Radiology: ordered.    Risk  Prescription drug management.                          Medical Decision Making:   Initial Assessment:   Urgent evaluation of 40-year-old healthy male presenting with lower back pain, left greater than right status post picking up a bucket of water earlier this morning.  States that he felt a "pop" in his back.  Has been unable to stand straight up since the injury.  He denies fever, chills, urinary retention, bladder or bowel incontinence, or saddle anesthesia.  On exam, he is well-appearing and nontoxic.  Hemodynamically stable.  Afebrile in the ED. Left paraspinal lumbar tenderness to palpation.  Plan for x-ray of the lumbar spine.  Will give Toradol, lidocaine patches and muscle relaxer.  Differential Diagnosis:   Differential diagnosis includes but not limited to lumbar strain, fracture, subluxation, disc herniation, spinal stenosis, arthritis  Clinical Tests:   Radiological Study: Ordered and Reviewed  ED Management:  On review of x-ray of the lumbar spine, there are no acute fractures or subluxations.  No masses or bone lesions.  I updated the patient on all results.  Explained that he is likely experiencing lumbar strain versus muscle spasms.  States that he is feeling better after receiving lidocaine patches.  Discharged with prescriptions for ibuprofen, Robaxin, lidocaine patches.  Stressed importance of refraining from any heavy lifting or strenuous exercise for the next few days.  Work excuse note was provided.  Ambulatory referral to physical therapy was provided.  Patient verbalized understanding and agreement with this plan of care. He " was given specific return precautions. Advised to follow up with PCP as needed. All questions and concerns addressed. He is stable for discharge.     This note was created with MModal Fluency Direct Dictation. Please excuse any spelling or grammatical errors.         Provider Attestation for Scribe: I, Erik Aguirre, reviewed documentation as scribed in my presence, which is both accurate and complete.      Clinical Impression:  Final diagnoses:  [M54.50] Acute left-sided low back pain without sciatica  [S39.012A] Lumbar strain, initial encounter (Primary)          ED Disposition Condition    Discharge Stable          ED Prescriptions       Medication Sig Dispense Start Date End Date Auth. Provider    ibuprofen (ADVIL,MOTRIN) 800 MG tablet Take 1 tablet (800 mg total) by mouth every 6 (six) hours as needed for Pain. 20 tablet 6/12/2025 6/17/2025 Erik Aguirre PA-C    methocarbamoL (ROBAXIN) 500 MG Tab Take 2 tablets (1,000 mg total) by mouth 3 (three) times daily as needed (back pain/spasms). 15 tablet 6/12/2025 6/17/2025 Erik Aguirre PA-C    LIDOcaine (LIDODERM) 5 % Place 2 patches onto the skin once daily. Remove & Discard patch within 12 hours or as directed by MD for 5 days 10 patch 6/12/2025 6/17/2025 Erik Aguirre PA-C          Follow-up Information    None       Launch MDCalc MDM  MDCalc MDM Module  Jun 12 2025 2:36 PM [Erik Aguirre]  Data:  - Independent interpretation: I independently reviewed the XR L-spine AP+Lat.  - Test/documents: 1 test ordered  Additional encounter diagnoses: Acute left-sided low back pain without sciatica, Lumbar strain, initial encounter  Risk: LIDOcaine patch + 5 more (Rx drug management)             [1]   Social History  Tobacco Use    Smoking status: Every Day   Substance Use Topics    Alcohol use: Not Currently    Drug use: Not Currently        Erik Aguirre PA-C  06/12/25 [id number]

## 2025-06-12 NOTE — Clinical Note
"Kaleb "Chapo Wolfe was seen and treated in our emergency department on 6/12/2025.  He may return to work on 06/13/2025.  Pt needs to refrain from any heavy lifting or strenuous activity for the next 5 days.      If you have any questions or concerns, please don't hesitate to call.      Erik Aguirre PA-C"

## 2025-06-12 NOTE — ED TRIAGE NOTES
Kaleb Cesar Wolfe, a 40 y.o. male presents to the ED via EMS with complaints of lower back pain, left side, that started immediately after picking up full mop bucket. ED workup in progress. Call light within reach. Safety measures in place. Denies further needs. Plan of care ongoing.